# Patient Record
Sex: FEMALE | Race: WHITE | NOT HISPANIC OR LATINO | Employment: STUDENT | URBAN - METROPOLITAN AREA
[De-identification: names, ages, dates, MRNs, and addresses within clinical notes are randomized per-mention and may not be internally consistent; named-entity substitution may affect disease eponyms.]

---
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
## 2023-04-07 ENCOUNTER — OFFICE VISIT (OUTPATIENT)
Dept: OBGYN CLINIC | Facility: CLINIC | Age: 18
End: 2023-04-07

## 2023-04-07 VITALS
BODY MASS INDEX: 25.52 KG/M2 | HEIGHT: 63 IN | SYSTOLIC BLOOD PRESSURE: 110 MMHG | DIASTOLIC BLOOD PRESSURE: 60 MMHG | WEIGHT: 144 LBS

## 2023-04-07 DIAGNOSIS — Z11.3 SCREEN FOR STD (SEXUALLY TRANSMITTED DISEASE): ICD-10-CM

## 2023-04-07 DIAGNOSIS — B37.31 CANDIDA VAGINITIS: ICD-10-CM

## 2023-04-07 DIAGNOSIS — N76.2 ACUTE VULVITIS: Primary | ICD-10-CM

## 2023-04-07 LAB
BV WHIFF TEST VAG QL: ABNORMAL
CLUE CELLS SPEC QL WET PREP: ABNORMAL
PH SMN: 5 [PH]
SL AMB POCT WET MOUNT: ABNORMAL
T VAGINALIS VAG QL WET PREP: ABNORMAL
YEAST VAG QL WET PREP: ABNORMAL

## 2023-04-07 RX ORDER — FLUCONAZOLE 150 MG/1
150 TABLET ORAL DAILY
Qty: 3 TABLET | Refills: 1 | Status: SHIPPED | OUTPATIENT
Start: 2023-04-07 | End: 2023-04-10

## 2023-04-07 NOTE — PROGRESS NOTES
"PROBLEM GYNECOLOGICAL VISIT    Jane Hawthorne is a 21 y o  female who presents today with complaint of sore  Vulvar area  Her general medical history has been reviewed and she reports it as follows:    Past Medical History:   Diagnosis Date   • Gardasil complete per pt report    • Migraine      Past Surgical History:   Procedure Laterality Date   • WISDOM TOOTH EXTRACTION       OB History    No obstetric history on file  Social History     Tobacco Use   • Smoking status: Never     Passive exposure: Never   • Smokeless tobacco: Never   Vaping Use   • Vaping Use: Never used   Substance Use Topics   • Alcohol use: Not Currently   • Drug use: Never       Current Outpatient Medications   Medication Instructions   • fluconazole (DIFLUCAN) 150 mg, Oral, Daily, Take one  Tablet by mouth   Every  3 days for a total of 3 doses       History of Present Illness:   Broke up with  boyfriend 2 weeks ago  Now w  Vaginal  Pain , itching and  Dc  Onset  2 weeks ago  It burned and was painful on urination this am   Urine touching the outside  No fevers or chills  Review of Systems:  Review of Systems   Constitutional: Negative for activity change, appetite change and chills  Gastrointestinal: Negative  Genitourinary: Positive for dysuria, vaginal discharge and vaginal pain  Negative for decreased urine volume, difficulty urinating, dyspareunia, enuresis, flank pain, frequency, menstrual problem, pelvic pain, urgency and vaginal bleeding  Neurological: Negative  Psychiatric/Behavioral: Negative  Physical Exam:  /60 (BP Location: Left arm, Patient Position: Sitting, Cuff Size: Standard)   Ht 5' 3\" (1 6 m)   Wt 65 3 kg (144 lb)   LMP 03/14/2023   BMI 25 51 kg/m²   Physical Exam  Constitutional:       Appearance: Normal appearance  Genitourinary:      Bladder, rectum and urethral meatus normal       No lesions in the vagina        Genitourinary Comments: Copious white chunky dc   Ph 5 neg " wiff no clue  + budding and  Hyphae  Right Labia: tenderness  Right Labia: No rash, lesions or skin changes  Left Labia: tenderness  Left Labia: No lesions, skin changes or rash  No inguinal adenopathy present in the right or left side  Pelvic Valentino Score: 4/5  Vaginal discharge, erythema and tenderness present  No vaginal bleeding or ulceration  No vaginal prolapse present  Right Adnexa: not tender, not full, no mass present and not absent  Left Adnexa: not tender, not full, no mass present and not absent  Cervix is nulliparous  No cervical motion tenderness, discharge, friability or lesion  Uterus is not enlarged, fixed, tender, irregular or prolapsed  No uterine mass detected  No urethral prolapse, tenderness, mass or hypermobility present  Pelvic Floor: Levator muscle strength is 4/5  Pelvic floor neuro is intact  Pelvic exam was performed with patient in the lithotomy position  Abdominal:      Palpations: Abdomen is soft  Hernia: There is no hernia in the left inguinal area or right inguinal area  Lymphadenopathy:      Lower Body: No right inguinal adenopathy  No left inguinal adenopathy  Vitals and nursing note reviewed  Point of Care Testing:   Ph 5, neg whif f + hyphae and  Budding  No clue or trichomadeses      Assessment:   1  Candida vaginitis,  Acute vulvitis,   Screen for std     Plan:   GC chlma sent,  Open to air  vaginal rest,  Wash with plain water  Diflucan 150 mg today and repeat  Every 3 days for a total of 3  Doses  Aquaphor  Healing ointment   To the outside  At least  4 x a day  Fu prn   May pur warm water over vulva  While urinating  Reviewed with patient that test results are available in MyChart immediately, but that they will not necessarily be reviewed by me immediately    Explained that I will review results at my earliest opportunity and contact patient appropriately

## 2023-04-09 LAB
C TRACH RRNA SPEC QL NAA+PROBE: NEGATIVE
N GONORRHOEA RRNA SPEC QL NAA+PROBE: NEGATIVE

## 2023-05-08 ENCOUNTER — OFFICE VISIT (OUTPATIENT)
Dept: OBGYN CLINIC | Facility: CLINIC | Age: 18
End: 2023-05-08

## 2023-05-08 VITALS
SYSTOLIC BLOOD PRESSURE: 110 MMHG | WEIGHT: 144 LBS | BODY MASS INDEX: 25.52 KG/M2 | DIASTOLIC BLOOD PRESSURE: 70 MMHG | HEIGHT: 63 IN

## 2023-05-08 DIAGNOSIS — B37.31 CANDIDA VAGINITIS: ICD-10-CM

## 2023-05-08 DIAGNOSIS — N76.2 ACUTE VULVITIS: Primary | ICD-10-CM

## 2023-05-08 DIAGNOSIS — Z11.3 SCREEN FOR STD (SEXUALLY TRANSMITTED DISEASE): ICD-10-CM

## 2023-05-08 LAB
BV WHIFF TEST VAG QL: ABNORMAL
CLUE CELLS SPEC QL WET PREP: ABNORMAL
PH SMN: 4 [PH]
SL AMB POCT WET MOUNT: ABNORMAL
T VAGINALIS VAG QL WET PREP: ABNORMAL
YEAST VAG QL WET PREP: ABNORMAL

## 2023-05-08 RX ORDER — CLOTRIMAZOLE AND BETAMETHASONE DIPROPIONATE 10; .64 MG/G; MG/G
CREAM TOPICAL 2 TIMES DAILY
Qty: 30 G | Refills: 0 | Status: SHIPPED | OUTPATIENT
Start: 2023-05-08

## 2023-05-08 RX ORDER — FLUCONAZOLE 150 MG/1
150 TABLET ORAL DAILY
Qty: 2 TABLET | Refills: 1 | Status: SHIPPED | OUTPATIENT
Start: 2023-05-08 | End: 2023-05-10

## 2023-05-08 NOTE — PATIENT INSTRUCTIONS
Vaginitis   AMBULATORY CARE:   Vaginitis  is an inflammation or infection of your vagina  Vaginitis is commonly caused by a bacterial or fungal infection  Other causes include a foreign object or exposure to a chemical irritant  You may be given medicines to treat an infection caused by bacteria or a fungus  Medicines may be given as a pill, or as a cream, gel, or tablet you insert into your vagina  Common signs and symptoms:   Pain, itching, redness, burning, or swelling in your vagina    An odor from your vagina that may be foul or smell like fish    Thick, curd-like discharge    Thin, gray-white discharge    Small skin tears or chafing    Painful sexual intercourse    Pain when you urinate    Call your doctor if:   You have unusual vaginal bleeding  You have severe abdominal pain  You have a fever  Your symptoms get worse, even after treatment  Your symptoms return  You have questions or concerns about your condition or care  Manage your symptoms:   Take a sitz bath  Fill a bathtub with 4 to 6 inches of warm water  You may also use a sitz bath pan that fits inside a toilet bowl  Sit in the sitz bath for 15 minutes  Do this 3 times a day, and after each bowel movement  The warm water can help decrease pain and swelling  Use over-the-counter creams or ointments as directed  Examples include petroleum jelly, zinc creams, or hydrocortisone creams  These will help decrease itching and inflammation  Do not wear tight-fitting clothes or undergarments  These can make your symptoms worse  Do not use douches other irritating products in your vagina  Examples include bubble baths and perfumed soaps  The vagina is delicate and easily irritated  Ask your healthcare provider if it is okay to use tampons during your monthly periods  You may need to use pads instead until your symptoms go away  Do not have sex until your symptoms go away  When you have sex, always use a condom   Condoms can help protect you from contact with fluids from your partner that may be causing your vaginitis  Prevent infection:   Wash your hands  with soap and water after you use the toilet  Wipe from front to back  Do this after you urinate or have a bowel movement  This will prevent germs from getting into your vagina  Wash your vagina each day  Use mild, unscented soap  Let the area air dry  Follow up with your doctor as directed: You may need to return within 3 months for more testing to make sure the infection has not returned  Write down your questions so you remember to ask them during your visits  © Copyright [facility] [employer] 2022 Information is for End User's use only and may not be sold, redistributed or otherwise used for commercial purposes  The above information is an  only  It is not intended as medical advice for individual conditions or treatments  Talk to your doctor, nurse or pharmacist before following any medical regimen to see if it is safe and effective for you

## 2023-05-08 NOTE — PROGRESS NOTES
PROBLEM GYNECOLOGICAL VISIT    Carley Lagunas is a 21 y o  female who presents today with complaint of vagianl and vulvar itching and burning     Her general medical history has been reviewed and she reports it as follows:    Past Medical History:   Diagnosis Date   • Gardasil complete per pt report    • Migraine      Past Surgical History:   Procedure Laterality Date   • WISDOM TOOTH EXTRACTION       OB History    No obstetric history on file  Social History     Tobacco Use   • Smoking status: Never     Passive exposure: Never   • Smokeless tobacco: Never   Vaping Use   • Vaping Use: Never used   Substance Use Topics   • Alcohol use: Not Currently   • Drug use: Never       No current outpatient medications    History of Present Illness: Pt utilized  diflucan a few weeks ago  Period came and felt much better  Over the past  2 weeks has had  Itching  + burning   Outside is sore  New partner  + use of condoms   No change in personal products  No fevers or chills  Period started  2 d ago  Most discomfort is outside  Uses pads at night an tampons during  the day  Multiple use of   Topical  Yeast meds   Review of Systems:  Review of Systems   Constitutional: Negative  Gastrointestinal: Negative  Genitourinary: Positive for pelvic pain and vaginal discharge  Negative for decreased urine volume, difficulty urinating, dyspareunia, dysuria, enuresis, flank pain, frequency, genital sores, hematuria, menstrual problem, urgency, vaginal bleeding and vaginal pain  Musculoskeletal: Negative  Neurological: Negative  Hematological: Negative  Psychiatric/Behavioral: Negative  Physical Exam:  There were no vitals taken for this visit  Physical Exam  Constitutional:       Appearance: Normal appearance  Genitourinary:      Vulva, bladder, rectum and urethral meatus normal       Right Labia: No rash, tenderness, lesions or skin changes       Left Labia: No tenderness, lesions, skin changes or rash  No inguinal adenopathy present in the right or left side  Pelvic Valentino Score: 4/5  Vaginal bleeding present  No vaginal discharge, erythema, tenderness, ulceration or granulation tissue  No vaginal prolapse present  No vaginal atrophy present  Right Adnexa: not tender, not full and no mass present  Left Adnexa: not tender, not full and no mass present  No cervical motion tenderness  Uterus is not enlarged, fixed, tender, irregular or prolapsed  No uterine mass detected  Pelvic floor neuro is intact  Pelvic exam was performed with patient in the lithotomy position  Abdominal:      General: Abdomen is flat  Bowel sounds are normal  There is no distension  Palpations: Abdomen is soft  There is no mass  Tenderness: There is no abdominal tenderness  There is no guarding or rebound  Hernia: No hernia is present  There is no hernia in the left inguinal area or right inguinal area  Musculoskeletal:         General: Normal range of motion  Lymphadenopathy:      Lower Body: No right inguinal adenopathy  No left inguinal adenopathy  Neurological:      Mental Status: She is alert and oriented to person, place, and time  Skin:     General: Skin is warm and dry  Psychiatric:         Mood and Affect: Mood normal          Behavior: Behavior normal          Thought Content: Thought content normal          Judgment: Judgment normal    Vitals and nursing note reviewed  Point of Care Testing:   -Wet mount: neg   -KOH mount: neg   -Whiff: neg  Ph  4  Blood present     -urine pregnancy test: na   -urinalysis: na    Assessment:   1  Ongoing vaginitis  Multiple otc  , Vulvitis  w  No relief w oral meds, Menses ,   Screen for std      Plan:   GC   Chlam done,  Open to air , Diflucan 150 mg today May use pea size  Lotrisone to the external vulvar area  May use A+D ointment  Always have something on the vulva    Open toair No underwear to bed at night       Reviewed with patient that test results are available in MyChart immediately, but that they will not necessarily be reviewed by me immediately  Explained that I will review results at my earliest opportunity and contact patient appropriately

## 2023-05-11 LAB
C TRACH RRNA SPEC QL NAA+PROBE: NEGATIVE
N GONORRHOEA RRNA SPEC QL NAA+PROBE: NEGATIVE

## 2023-05-18 ENCOUNTER — OFFICE VISIT (OUTPATIENT)
Dept: OBGYN CLINIC | Facility: CLINIC | Age: 18
End: 2023-05-18

## 2023-05-18 VITALS
BODY MASS INDEX: 25.52 KG/M2 | SYSTOLIC BLOOD PRESSURE: 110 MMHG | DIASTOLIC BLOOD PRESSURE: 78 MMHG | HEIGHT: 63 IN | WEIGHT: 144 LBS

## 2023-05-18 DIAGNOSIS — B37.9 YEAST INFECTION: Primary | ICD-10-CM

## 2023-05-18 LAB
BV WHIFF TEST VAG QL: NEGATIVE
CLUE CELLS SPEC QL WET PREP: ABNORMAL
PH SMN: 4.5 [PH]
SL AMB POCT WET MOUNT: ABNORMAL
T VAGINALIS VAG QL WET PREP: ABNORMAL
YEAST VAG QL WET PREP: ABNORMAL

## 2023-05-18 NOTE — PROGRESS NOTES
Assessment/Plan:    Yeast infection  Reviewed continued yeast infection on exam  Will trial a course of terconazole to see if can achieve clearance  Terconazole 3 day sent to pharmacy  For prevention: Recommend acidophilus or yogurt daily, avoid irritating soaps or lotions, no tight fitted pants or underwear, avoid bubble baths, do not stay in wet swimsuit  Reach out to office if symptoms are not improving or worsening  Problem List Items Addressed This Visit        Other    Yeast infection - Primary     Reviewed continued yeast infection on exam  Will trial a course of terconazole to see if can achieve clearance  Terconazole 3 day sent to pharmacy  For prevention: Recommend acidophilus or yogurt daily, avoid irritating soaps or lotions, no tight fitted pants or underwear, avoid bubble baths, do not stay in wet swimsuit  Reach out to office if symptoms are not improving or worsening  Relevant Medications    terconazole (TERAZOL 3) 0 8 % vaginal cream    Other Relevant Orders    POCT wet mount (Completed)         Subjective:      Patient ID: Trisha Vega is a 21 y o  female  HPI  22 yo seen for continued vaginal irritation  Patient was seen 4/7/2023 and treated for yeast infection  Was seen again on 5/8/2023 with continued symptoms  At that time was treated with Lotrisone cream  Patient reports has also tried Monistat with no relief  Reports still having some thin white vaginal discharge and itching and irritation of vulva  Denies any STD concerns, STD testing negative at last visit, no new partners  Denies vaginal odor  The following portions of the patient's history were reviewed and updated as appropriate:   She  has a past medical history of Gardasil complete per pt report and Migraine    She   Patient Active Problem List    Diagnosis Date Noted   • Yeast infection 05/18/2023   • Screen for STD (sexually transmitted disease) 05/08/2023   • Candida vaginitis 05/08/2023 • Encounter for gynecological examination (general) (routine) without abnormal findings 12/16/2021     She  has a past surgical history that includes [place] tooth extraction  Her family history includes Arthritis in her father; Asthma in her mother; Heart failure (age of onset: 48) in her father; Hypertension in her father and mother; No Known Problems in her brother, maternal grandfather, and maternal grandmother  She  reports that she has never smoked  She has never been exposed to tobacco smoke  She has never used smokeless tobacco  She reports that she does not currently use alcohol  She reports that she does not use drugs  Current Outpatient Medications   Medication Sig Dispense Refill   • clotrimazole-betamethasone (LOTRISONE) 1-0 05 % cream Apply topically 2 (two) times a day 30 g 0   • terconazole (TERAZOL 3) 0 8 % vaginal cream Insert 1 applicator into the vagina daily at bedtime for 3 days 20 g 0     No current facility-administered medications for this visit  She has No Known Allergies       Review of Systems   Constitutional: Negative for fatigue, fever and unexpected weight change  HENT: Negative for dental problem and sinus pressure  Eyes: Negative for visual disturbance  Respiratory: Negative for cough, shortness of breath and wheezing  Cardiovascular: Negative for chest pain  Gastrointestinal: Negative for abdominal pain, blood in stool, constipation, diarrhea, nausea and vomiting  Endocrine: Negative for polydipsia  Genitourinary: Positive for vaginal discharge and vaginal pain  Negative for difficulty urinating, dyspareunia, dysuria, frequency, hematuria, pelvic pain and urgency  Musculoskeletal: Negative for arthralgias and back pain  Neurological: Negative for dizziness, seizures, light-headedness and headaches  Psychiatric/Behavioral: Negative for suicidal ideas  The patient is not nervous/anxious            Objective:      /78 (BP Location: Right arm, Patient "Position: Sitting, Cuff Size: Standard)   Ht 5' 3\" (1 6 m)   Wt 65 3 kg (144 lb)   LMP 05/06/2023   BMI 25 51 kg/m²          Physical Exam  Constitutional:       Appearance: Normal appearance  She is well-developed  Neck:      Thyroid: No thyroid mass or thyromegaly  Cardiovascular:      Rate and Rhythm: Normal rate and regular rhythm  Heart sounds: Normal heart sounds  No murmur heard  No friction rub  No gallop  Pulmonary:      Effort: Pulmonary effort is normal  No respiratory distress  Breath sounds: Normal breath sounds  No wheezing or rales  Abdominal:      General: Bowel sounds are normal  There is no distension  Palpations: Abdomen is soft  There is no mass  Tenderness: There is no abdominal tenderness  There is no guarding or rebound  Genitourinary:     Labia:         Right: No rash, tenderness, lesion or injury  Left: No rash, tenderness, lesion or injury  Urethra: No prolapse, urethral pain, urethral swelling or urethral lesion  Vagina: No signs of injury  Vaginal discharge (thin white discharge) and erythema present  No tenderness or bleeding  Cervix: No cervical motion tenderness, discharge or friability  Uterus: Not deviated, not enlarged and not tender  Adnexa:         Right: No mass, tenderness or fullness  Left: No mass, tenderness or fullness  Musculoskeletal:      Cervical back: Neck supple  Lymphadenopathy:      Cervical: No cervical adenopathy  Upper Body:      Right upper body: No supraclavicular adenopathy  Left upper body: No supraclavicular adenopathy  Skin:     General: Skin is warm and dry  Coloration: Skin is not pale  Findings: No erythema or rash  Neurological:      Mental Status: She is alert and oriented to person, place, and time  Psychiatric:         Behavior: Behavior normal          Thought Content:  Thought content normal          Judgment: Judgment normal        " Wet mount: budding yeast throughout  Scant clue cells  Whiff negative  PH: 4 5 No trichomonads

## 2023-05-18 NOTE — ASSESSMENT & PLAN NOTE
Reviewed continued yeast infection on exam  Will trial a course of terconazole to see if can achieve clearance  Terconazole 3 day sent to pharmacy  For prevention: Recommend acidophilus or yogurt daily, avoid irritating soaps or lotions, no tight fitted pants or underwear, avoid bubble baths, do not stay in wet swimsuit  Reach out to office if symptoms are not improving or worsening

## 2023-07-07 PROBLEM — Z11.3 SCREEN FOR STD (SEXUALLY TRANSMITTED DISEASE): Status: RESOLVED | Noted: 2023-05-08 | Resolved: 2023-07-07

## 2023-08-07 ENCOUNTER — OFFICE VISIT (OUTPATIENT)
Dept: OBGYN CLINIC | Facility: CLINIC | Age: 18
End: 2023-08-07
Payer: COMMERCIAL

## 2023-08-07 VITALS
HEIGHT: 63 IN | SYSTOLIC BLOOD PRESSURE: 110 MMHG | BODY MASS INDEX: 25.52 KG/M2 | WEIGHT: 144 LBS | DIASTOLIC BLOOD PRESSURE: 70 MMHG

## 2023-08-07 DIAGNOSIS — Z30.09 CONTRACEPTIVE EDUCATION: ICD-10-CM

## 2023-08-07 DIAGNOSIS — N92.6 IRREGULAR MENSES: Primary | ICD-10-CM

## 2023-08-07 PROBLEM — Z11.3 SCREEN FOR STD (SEXUALLY TRANSMITTED DISEASE): Status: ACTIVE | Noted: 2023-08-07

## 2023-08-07 PROCEDURE — 99214 OFFICE O/P EST MOD 30 MIN: CPT | Performed by: OBSTETRICS & GYNECOLOGY

## 2023-08-07 RX ORDER — LEVONORGESTREL AND ETHINYL ESTRADIOL 0.1-0.02MG
1 KIT ORAL DAILY
Qty: 90 TABLET | Refills: 0 | Status: SHIPPED | OUTPATIENT
Start: 2023-08-07 | End: 2023-11-05

## 2023-08-07 NOTE — PROGRESS NOTES
PROBLEM GYNECOLOGICAL VISIT    Lea Shaikh is a 21 y.o. female who presents today with complaint of cycles  Being  Unpredictable  cramps.  + PMS Her general medical history has been reviewed and she reports it as follows:    Past Medical History:   Diagnosis Date   • Anemia    • Gardasil complete per pt report    • Migraine      Past Surgical History:   Procedure Laterality Date   • WISDOM TOOTH EXTRACTION       OB History    No obstetric history on file. Social History     Tobacco Use   • Smoking status: Never     Passive exposure: Never   • Smokeless tobacco: Never   Vaping Use   • Vaping Use: Never used   Substance Use Topics   • Alcohol use: Not Currently   • Drug use: Never       Current Outpatient Medications   Medication Instructions   • clotrimazole-betamethasone (LOTRISONE) 1-0.05 % cream Topical, 2 times daily       History of Present Illness:   + hx of  PMS  Symptoms   breast tenderness for 1-2  D  w  Acne and fatigue. Periods   19  D- 40 d  from  Day one to day one  . Period was  6 d .  + condoms. Most worrisome is how unpredictable  Period s are and  Flow can wax and  Wane. Review of Systems:  Review of Systems   Constitutional: Negative. Respiratory: Negative. Cardiovascular: Negative. Gastrointestinal: Negative. Genitourinary: Positive for menstrual problem and vaginal bleeding. Negative for difficulty urinating, dyspareunia, dysuria, flank pain, frequency, pelvic pain, urgency, vaginal discharge and vaginal pain. Mood swings,  Breast tenderness  , menstrual cramps ,  Flow can  Be heavy then light     Musculoskeletal: Negative. Neurological: Negative. Psychiatric/Behavioral: Negative. Physical Exam:  /70 (BP Location: Left arm, Patient Position: Sitting, Cuff Size: Standard)   Ht 5' 3" (1.6 m)   Wt 65.3 kg (144 lb)   LMP 08/04/2023   BMI 25.51 kg/m²   Physical Exam  Constitutional:       Appearance: Normal appearance.    Neurological: Mental Status: She is alert and oriented to person, place, and time. Psychiatric:         Mood and Affect: Mood normal.         Behavior: Behavior normal.         Thought Content: Thought content normal.         Judgment: Judgment normal.   Vitals reviewed. Labs reviewed from  Fall 2022. Elevated   BUN Creat  Ratio. Needs fu with primary. Menstrual calender reviewed. + condoms. Pt afraid of  Gaining weight and acne w  OCP use. Same partner. R+B of  HARDY, POP, Nuvaring, Patch  , Depo and LARCS  DW pt.    DW pt differences in  Progesterones. Condoms always. Benefits would  Be  Regulation of  Menses, PMS  Symptoms  And  Flow decreased . Reviewed ACHES and BTB. Start pill either today or day one of menses one tablet daily. Assessment:   1. PMS symptoms  dw pt   OCP vs  Prozac, 2. Irregular menstrual  Pattern. Report cycles less than 21 d form day one to day one, lasting greater than 10 d or no menses in 3 mo   Option to regulate. Condoms all the time. I have spent a total time of 30 minutes on 08/07/23 in caring for this patient including Risks and benefits of tx options, Instructions for management, Importance of tx compliance, Counseling / Coordination of care, Documenting in the medical record and Reviewing / ordering tests, medicine, procedures  . Reviewed with patient that test results are available in MyChart immediately, but that they will not necessarily be reviewed by me immediately. Explained that I will review results at my earliest opportunity and contact patient appropriately.

## 2023-08-07 NOTE — PATIENT INSTRUCTIONS
- Through a patient centered approach to contraceptive counseling, we discussed a variety of contraceptive methods including pill, patch, ring, injectable, long-acting reversible methods such as the subdermal contraceptive implant and intrauterine device, and sterilization. We compared the efficacy of various methods using a tiered efficacy chart. We discussed the expected changes on menstrual bleeding and other side effects of various methods. - We assessed for medical conditions that could affect the safety profile of contraception. She does not smoke, denies a history of hypertension or VTE, and denies a history of migraine with aura.    - The patient opts to proceed with the pill

## 2023-09-25 ENCOUNTER — TELEPHONE (OUTPATIENT)
Dept: OBGYN CLINIC | Facility: CLINIC | Age: 18
End: 2023-09-25

## 2023-09-25 DIAGNOSIS — N92.6 IRREGULAR MENSES: ICD-10-CM

## 2023-09-25 RX ORDER — LEVONORGESTREL AND ETHINYL ESTRADIOL 0.1-0.02MG
1 KIT ORAL DAILY
Qty: 28 TABLET | Refills: 0 | Status: SHIPPED | OUTPATIENT
Start: 2023-09-25 | End: 2023-12-24

## 2023-09-25 NOTE — TELEPHONE ENCOUNTER
Patient left v/m requesting a refill for her birth control. Her last visit was on 8/7/23 for irregular menses. Her pharmacy is [facility] as on file. Karen, please advise.

## 2023-09-26 NOTE — TELEPHONE ENCOUNTER
Spoke with Tawanda Ugalde, reviewed Karen's reply. Patient in agreement to schedule well annual .Transferred to reception.

## 2023-10-06 PROBLEM — Z11.3 SCREEN FOR STD (SEXUALLY TRANSMITTED DISEASE): Status: RESOLVED | Noted: 2023-08-07 | Resolved: 2023-10-06

## 2023-10-29 DIAGNOSIS — N92.6 IRREGULAR MENSES: ICD-10-CM

## 2023-10-29 RX ORDER — LEVONORGESTREL AND ETHINYL ESTRADIOL 0.1-0.02MG
1 KIT ORAL DAILY
Qty: 28 TABLET | Refills: 0 | Status: SHIPPED | OUTPATIENT
Start: 2023-10-29 | End: 2023-10-31

## 2023-10-31 ENCOUNTER — ANNUAL EXAM (OUTPATIENT)
Dept: OBGYN CLINIC | Facility: CLINIC | Age: 18
End: 2023-10-31
Payer: COMMERCIAL

## 2023-10-31 VITALS
BODY MASS INDEX: 24.24 KG/M2 | HEIGHT: 64 IN | DIASTOLIC BLOOD PRESSURE: 68 MMHG | WEIGHT: 142 LBS | SYSTOLIC BLOOD PRESSURE: 106 MMHG

## 2023-10-31 DIAGNOSIS — Z01.419 GYNECOLOGIC EXAM NORMAL: Primary | ICD-10-CM

## 2023-10-31 DIAGNOSIS — Z11.3 SCREENING EXAMINATION FOR VENEREAL DISEASE: ICD-10-CM

## 2023-10-31 DIAGNOSIS — N94.6 DYSMENORRHEA: ICD-10-CM

## 2023-10-31 PROCEDURE — 99395 PREV VISIT EST AGE 18-39: CPT | Performed by: PHYSICIAN ASSISTANT

## 2023-10-31 RX ORDER — LEVONORGESTREL AND ETHINYL ESTRADIOL 0.15-0.03
1 KIT ORAL DAILY
Qty: 90 TABLET | Refills: 3 | Status: SHIPPED | OUTPATIENT
Start: 2023-10-31

## 2023-10-31 NOTE — PROGRESS NOTES
Assessment/Plan   Problem List Items Addressed This Visit          Other    Gynecologic exam normal - Primary     Pap guidelines reviewed. Will plan to start pap smears at age 22. STD cultures done today. Reviewed worsening cramping. Reviewed option of increasing dose of this pill vs switching to a different pill. Patient would like to increase dose of this pill first prior to having to adjust to a completely new hormones. Script for [employer] OCP sent to pharmacy. Reviewed when to start, what to do if misses pill. Recommend using condoms for the 1st month on the pill, if misses more than 2 pills in the pack, if on antibiotics and for STD prevention. Reviewed common side effects of the pill including nausea, vomiting, breast pain, bloating, fatigue, mood swings, weight gain, and increased acne. Reassured side effects typically diminish in the first month or two on the pill. Reviewed clotting risk and signs and symptoms of pulmonary embolism, DVT, myocardial infarction, stroke. Return to office for annual or as needed. Other Visit Diagnoses       Dysmenorrhea        Relevant Medications    levonorgestrel-ethinyl estradiol (Chateal) 0.15-30 MG-MCG per tablet    Screening examination for venereal disease        Relevant Orders    Chlamydia/GC amplified DNA by PCR            Subjective:     Patient ID: Mahad Haynes is a 19 y.o. y.o. female. HPI  20 yo seen for annual exam. Currently on [place] OCP,reports first month was fine and then second month more cramping, third month cramping and lasted 2 days longer than normal. Denies mood swings. Denies bowel or bladder issues. Reports menses start early on this pill, about 3 days prior to placebo. Last pap: N/A. The following portions of the patient's history were reviewed and updated as appropriate: She  has a past medical history of Anemia, Gardasil complete per pt report, and Migraine.   She   Patient Active Problem List    Diagnosis Date Noted    Gynecologic exam normal 10/31/2023    Irregular menses 2023    Yeast infection 2023    Tiffany vaginitis 2023    Encounter for gynecological examination (general) (routine) without abnormal findings 2021     She  has a past surgical history that includes [place] tooth extraction. Her family history includes Arthritis in her father; Asthma in her mother; Heart failure in her father; Hypertension in her father and mother; No Known Problems in her maternal grandfather and maternal grandmother; Osteoarthritis in her brother. She  reports that she has never smoked. She has never been exposed to tobacco smoke. She has never used smokeless tobacco. She reports that she does not currently use alcohol. She reports that she does not use drugs. Current Outpatient Medications   Medication Sig Dispense Refill    levonorgestrel-ethinyl estradiol (Chateal) 0.15-30 MG-MCG per tablet Take 1 tablet by mouth daily 90 tablet 3     No current facility-administered medications for this visit. She has No Known Allergies. .    Menstrual History:  OB History          0    Para   0    Term   0       0    AB   0    Living   0         SAB   0    IAB   0    Ectopic   0    Multiple   0    Live Births   0                  Patient's last menstrual period was 10/21/2023 (approximate). Review of Systems   Constitutional:  Negative for fatigue, fever and unexpected weight change. HENT:  Negative for dental problem and sinus pressure. Eyes:  Negative for visual disturbance. Respiratory:  Negative for cough, shortness of breath and wheezing. Cardiovascular:  Negative for chest pain. Gastrointestinal:  Negative for abdominal pain, blood in stool, constipation, diarrhea, nausea and vomiting. Endocrine: Negative for polydipsia. Genitourinary:  Positive for menstrual problem. Negative for difficulty urinating, dyspareunia, dysuria, frequency, hematuria, pelvic pain and urgency. Musculoskeletal:  Negative for arthralgias and back pain. Neurological:  Negative for dizziness, seizures, light-headedness and headaches. Psychiatric/Behavioral:  Negative for suicidal ideas. The patient is not nervous/anxious. Objective:  Vitals:    10/31/23 1246   BP: 106/68   BP Location: Left arm   Patient Position: Sitting   Cuff Size: Standard   Weight: 64.4 kg (142 lb)   Height: 5' 3.5" (1.613 m)      Physical Exam  Constitutional:       Appearance: Normal appearance. She is well-developed. Genitourinary:      Vulva and bladder normal.      No lesions in the vagina. Right Labia: No rash, tenderness, lesions or skin changes. Left Labia: No tenderness, lesions, skin changes or rash. No labial fusion noted. No inguinal adenopathy present in the right or left side. No vaginal discharge, erythema, tenderness or bleeding. Right Adnexa: not tender, not full and no mass present. Left Adnexa: not tender, not full and no mass present. No cervical motion tenderness, discharge or lesion. Uterus is not enlarged, tender or irregular. No uterine mass detected. No urethral prolapse, tenderness or mass present. Bladder is not tender. Breasts:     Breasts are symmetrical.      Right: No swelling, bleeding, inverted nipple, mass, nipple discharge, skin change or tenderness. Left: No swelling, bleeding, inverted nipple, mass, nipple discharge, skin change or tenderness. HENT:      Head: Normocephalic and atraumatic. Neck:      Thyroid: No thyromegaly. Cardiovascular:      Rate and Rhythm: Normal rate and regular rhythm. Heart sounds: Normal heart sounds. No murmur heard. No friction rub. No gallop. Pulmonary:      Effort: Pulmonary effort is normal. No respiratory distress. Breath sounds: Normal breath sounds. No wheezing. Abdominal:      General: There is no distension. Palpations: Abdomen is soft. There is no mass. Tenderness: There is no abdominal tenderness. There is no guarding or rebound. Hernia: No hernia is present. Lymphadenopathy:      Cervical: No cervical adenopathy. Upper Body:      Right upper body: No supraclavicular, axillary or pectoral adenopathy. Left upper body: No supraclavicular, axillary or pectoral adenopathy. Lower Body: No right inguinal adenopathy. No left inguinal adenopathy. Neurological:      Mental Status: She is alert and oriented to person, place, and time. Skin:     General: Skin is warm and dry.    Psychiatric:         Behavior: Behavior normal.

## 2023-10-31 NOTE — ASSESSMENT & PLAN NOTE
Pap guidelines reviewed. Will plan to start pap smears at age 22. STD cultures done today. Reviewed worsening cramping. Reviewed option of increasing dose of this pill vs switching to a different pill. Patient would like to increase dose of this pill first prior to having to adjust to a completely new hormones. Script for [employer] OCP sent to pharmacy. Reviewed when to start, what to do if misses pill. Recommend using condoms for the 1st month on the pill, if misses more than 2 pills in the pack, if on antibiotics and for STD prevention. Reviewed common side effects of the pill including nausea, vomiting, breast pain, bloating, fatigue, mood swings, weight gain, and increased acne. Reassured side effects typically diminish in the first month or two on the pill. Reviewed clotting risk and signs and symptoms of pulmonary embolism, DVT, myocardial infarction, stroke. Return to office for annual or as needed.

## 2023-11-02 LAB
C TRACH RRNA SPEC QL NAA+PROBE: NEGATIVE
N GONORRHOEA RRNA SPEC QL NAA+PROBE: NEGATIVE

## 2023-12-30 PROBLEM — Z01.419 GYNECOLOGIC EXAM NORMAL: Status: RESOLVED | Noted: 2023-10-31 | Resolved: 2023-12-30

## 2024-02-21 PROBLEM — Z01.419 ENCOUNTER FOR GYNECOLOGICAL EXAMINATION (GENERAL) (ROUTINE) WITHOUT ABNORMAL FINDINGS: Status: RESOLVED | Noted: 2021-12-16 | Resolved: 2024-02-21

## 2024-03-04 ENCOUNTER — HOSPITAL ENCOUNTER (EMERGENCY)
Dept: HOSPITAL 99 - EMR | Age: 19
LOS: 1 days | Discharge: HOME | End: 2024-03-05
Payer: COMMERCIAL

## 2024-03-04 VITALS — RESPIRATION RATE: 18 BRPM | SYSTOLIC BLOOD PRESSURE: 140 MMHG | DIASTOLIC BLOOD PRESSURE: 89 MMHG

## 2024-03-04 DIAGNOSIS — M62.838: Primary | ICD-10-CM

## 2024-03-04 DIAGNOSIS — E03.0: ICD-10-CM

## 2024-03-04 LAB
ALBUMIN SERPL-MCNC: 4.7 G/DL (ref 3.5–5)
ALP SERPL-CCNC: 80 U/L (ref 38–126)
ALT SERPL-CCNC: 17 U/L (ref 0–35)
AST SERPL-CCNC: 31 U/L (ref 14–36)
BUN SERPL-MCNC: 9 MG/DL (ref 7–17)
CALCIUM SERPL-MCNC: 9.2 MG/DL (ref 8.4–10.2)
CHLORIDE SERPL-SCNC: 104 MMOL/L (ref 98–107)
CO2 SERPL-SCNC: 25 MMOL/L (ref 22–30)
EGFR: > 60
ERYTHROCYTE [DISTWIDTH] IN BLOOD BY AUTOMATED COUNT: 18.7 % (ref 11.5–14.5)
GLUCOSE SERPL-MCNC: 90 MG/DL (ref 70–99)
HCT VFR BLD AUTO: 33.4 % (ref 37–47)
HGB BLD-MCNC: 10.5 G/DL (ref 12–16)
MCHC RBC AUTO-ENTMCNC: 31.4 G/DL (ref 33–37)
MCV RBC AUTO: 64.6 FL (ref 81–99)
NRBC BLD AUTO-RTO: 0 %
PLATELET # BLD AUTO: 331 10^3/UL (ref 130–400)
POTASSIUM SERPL-SCNC: 3.5 MMOL/L (ref 3.5–5.1)
PROT SERPL-MCNC: 8 G/DL (ref 6.3–8.2)
SODIUM SERPL-SCNC: 137 MMOL/L (ref 135–145)

## 2024-03-04 PROCEDURE — 99283 EMERGENCY DEPT VISIT LOW MDM: CPT

## 2024-03-05 RX ADMIN — DIAZEPAM 5 MG: 5 TABLET ORAL at 00:59

## 2024-09-10 NOTE — TELEPHONE ENCOUNTER
Pt needs to schedule  Wellness exam.  All her visits are problem visits  Since 2021  I will send one pack Statement Selected

## 2024-11-17 DIAGNOSIS — N94.6 DYSMENORRHEA: ICD-10-CM

## 2024-11-25 RX ORDER — LEVONORGESTREL AND ETHINYL ESTRADIOL 0.15-0.03
1 KIT ORAL DAILY
Qty: 84 TABLET | Refills: 0 | Status: SHIPPED | OUTPATIENT
Start: 2024-11-25 | End: 2024-11-27 | Stop reason: SDUPTHER

## 2024-11-27 ENCOUNTER — ANNUAL EXAM (OUTPATIENT)
Dept: OBGYN CLINIC | Facility: CLINIC | Age: 19
End: 2024-11-27
Payer: COMMERCIAL

## 2024-11-27 VITALS
SYSTOLIC BLOOD PRESSURE: 116 MMHG | WEIGHT: 142.2 LBS | DIASTOLIC BLOOD PRESSURE: 64 MMHG | BODY MASS INDEX: 25.2 KG/M2 | HEIGHT: 63 IN

## 2024-11-27 DIAGNOSIS — N94.6 DYSMENORRHEA: ICD-10-CM

## 2024-11-27 DIAGNOSIS — B96.89 BACTERIAL VAGINOSIS: ICD-10-CM

## 2024-11-27 DIAGNOSIS — Z01.419 ROUTINE GYNECOLOGICAL EXAMINATION: Primary | ICD-10-CM

## 2024-11-27 DIAGNOSIS — N76.0 BACTERIAL VAGINOSIS: ICD-10-CM

## 2024-11-27 LAB
CLUE CELLS SPEC QL WET PREP: ABNORMAL
PH SMN: 5 [PH]
SL AMB POCT WET MOUNT: ABNORMAL
T VAGINALIS VAG QL WET PREP: ABNORMAL
YEAST VAG QL WET PREP: ABNORMAL

## 2024-11-27 PROCEDURE — 99395 PREV VISIT EST AGE 18-39: CPT | Performed by: PHYSICIAN ASSISTANT

## 2024-11-27 PROCEDURE — 87210 SMEAR WET MOUNT SALINE/INK: CPT | Performed by: PHYSICIAN ASSISTANT

## 2024-11-27 RX ORDER — METRONIDAZOLE 500 MG/1
500 TABLET ORAL EVERY 12 HOURS SCHEDULED
Qty: 14 TABLET | Refills: 0 | Status: SHIPPED | OUTPATIENT
Start: 2024-11-27 | End: 2024-12-04

## 2024-11-27 RX ORDER — LEVONORGESTREL AND ETHINYL ESTRADIOL 0.15-0.03
1 KIT ORAL DAILY
Qty: 84 TABLET | Refills: 3 | Status: SHIPPED | OUTPATIENT
Start: 2024-11-27

## 2024-11-27 NOTE — PROGRESS NOTES
[facility] [place]  [address] [place] [address]    ASSESSMENT/PLAN: Karyna Gupta is a 19 y.o.  who presents for annual gynecologic exam.    Encounter for routine gynecologic examination  - Routine well woman exam completed today.  - Cervical Cancer Screening: Current ASCCP Guidelines reviewed. Last Pap: Not on file . History of abnormal: N/A  - HPV Vaccination status: Immunization series complete  - STI screening offered including HIV testing: offered, pt declined  - Contraceptive counseling discussed.  Current contraception: oral contraceptives (estrogen/progesterone), Altavera:       Additional problems addressed during this visit:  1. Routine gynecological examination  Assessment & Plan:  Pap guidelines reviewed. Pap with reflex done today.   Script for Altavera refilled to pharmacy.   Return to office for annual or as needed.   Orders:  -     IGP, rfx Aptima HPV ASCU  2. Dysmenorrhea  -     levonorgestrel-ethinyl estradiol (Altavera) 0.15-30 MG-MCG per tablet; Take 1 tablet by mouth daily  3. Bacterial vaginosis  Assessment & Plan:  Reviewed BV with patient in length.   Script for Metronidazole 500mg BID x 7 days given. Aware cannot drink alcohol on this medication.   For prevention: Recommend acidophilus or yogurt daily, avoid irritating soaps or lotions, no tight fitted pants or underwear, avoid bubble baths, do not stay in wet swimsuit. Reviewed boric acid suppositories post coital and after menses.   Call office if symptoms are not improving or returning.   Orders:  -     metroNIDAZOLE (FLAGYL) 500 mg tablet; Take 1 tablet (500 mg total) by mouth every 12 (twelve) hours for 7 days  -     POCT wet mount      CC:  Annual Gynecologic Examination    HPI: Karyna Gupta is a 19 y.o.  who presents for annual gynecologic examination. Currently Altavera OCP, tolerating well would like to continue. Denies bowel or bladder issues.   Reports vaginal discharge and  fishy odor. Comes and goes.     ROS: Negative except as noted in HPI    Patient's last menstrual period was 11/19/2024 (exact date).       She  reports being sexually active and has had partner(s) who are male. She reports using the following methods of birth control/protection: OCP and Pill.       The following portions of the patient's history were reviewed and updated as appropriate:   Past Medical History:   Diagnosis Date    Anemia     Gardasil complete per pt report     Migraine      Past Surgical History:   Procedure Laterality Date    WISDOM TOOTH EXTRACTION       Family History   Problem Relation Age of Onset    Asthma Mother     Hypertension Mother     Heart failure Father 50    Arthritis Father     Hypertension Father     Osteoarthritis Brother     No Known Problems Maternal Grandmother     No Known Problems Maternal Grandfather     Breast cancer Neg Hx     Uterine cancer Neg Hx     Colon cancer Neg Hx     Ovarian cancer Neg Hx     Thrombosis Neg Hx     Heart attack Neg Hx     Stroke Neg Hx      Social History     Socioeconomic History    Marital status: Single     Spouse name: None    Number of children: None    Years of education: None    Highest education level: None   Occupational History    None   Tobacco Use    Smoking status: Never     Passive exposure: Never    Smokeless tobacco: Never   Vaping Use    Vaping status: Never Used   Substance and Sexual Activity    Alcohol use: Not Currently    Drug use: Never    Sexual activity: Yes     Partners: Male     Birth control/protection: OCP, Pill   Other Topics Concern    None   Social History Narrative    None     Social Drivers of Health     Financial Resource Strain: Not on file   Food Insecurity: Not on file   Transportation Needs: Not on file   Physical Activity: Not on file   Stress: Not on file   Social Connections: Not on file   Intimate Partner Violence: Not on file   Housing Stability: Not on file     Outpatient Medications Marked as Taking for  "the 11/27/24 encounter (Annual Exam) with Neisha Domínguez PA-C   Medication    levonorgestrel-ethinyl estradiol (Altavera) 0.15-30 MG-MCG per tablet    metroNIDAZOLE (FLAGYL) 500 mg tablet    [DISCONTINUED] levonorgestrel-ethinyl estradiol (Altavera) 0.15-30 MG-MCG per tablet     No Known Allergies        Objective:  /64 (BP Location: Left arm, Patient Position: Sitting, Cuff Size: Standard)   Ht 5' 3\" (1.6 m)   Wt 64.5 kg (142 lb 3.2 oz)   LMP 11/19/2024 (Exact Date)   Breastfeeding No   BMI 25.19 kg/m²        Chaperone present? Offered, declines.      Physical Exam  Constitutional:       Appearance: Normal appearance. She is well-developed.   Genitourinary:      Vulva and bladder normal.      No lesions in the vagina.      Right Labia: No rash, tenderness, lesions or skin changes.     Left Labia: No tenderness, lesions, skin changes or rash.     No labial fusion noted.      No inguinal adenopathy present in the right or left side.     No vaginal discharge, erythema, tenderness or bleeding.        Right Adnexa: not tender, not full and no mass present.     Left Adnexa: not tender, not full and no mass present.     No cervical motion tenderness, discharge or lesion.      Uterus is not enlarged, tender or irregular.      No uterine mass detected.     No urethral prolapse, tenderness or mass present.      Bladder is not tender.    Breasts:     Breasts are symmetrical.      Right: No swelling, bleeding, inverted nipple, mass, nipple discharge, skin change or tenderness.      Left: No swelling, bleeding, inverted nipple, mass, nipple discharge, skin change or tenderness.   HENT:      Head: Normocephalic and atraumatic.   Neck:      Thyroid: No thyromegaly.   Cardiovascular:      Rate and Rhythm: Normal rate and regular rhythm.      Heart sounds: Normal heart sounds. No murmur heard.     No friction rub. No gallop.   Pulmonary:      Effort: Pulmonary effort is normal. No respiratory distress.      Breath " sounds: Normal breath sounds. No wheezing.   Abdominal:      General: There is no distension.      Palpations: Abdomen is soft. There is no mass.      Tenderness: There is no abdominal tenderness. There is no guarding or rebound.      Hernia: No hernia is present.   Lymphadenopathy:      Cervical: No cervical adenopathy.      Upper Body:      Right upper body: No supraclavicular, axillary or pectoral adenopathy.      Left upper body: No supraclavicular, axillary or pectoral adenopathy.      Lower Body: No right inguinal adenopathy. No left inguinal adenopathy.   Neurological:      Mental Status: She is alert and oriented to person, place, and time.   Skin:     General: Skin is warm and dry.   Psychiatric:         Behavior: Behavior normal.         Wet mount: clue cells throughout. No trichomonads or yeast. Ph: 5.     Neisha Domínguez PA-C  11/29/2024 8:31 AM

## 2024-11-29 PROBLEM — N76.0 BACTERIAL VAGINOSIS: Status: ACTIVE | Noted: 2024-11-29

## 2024-11-29 PROBLEM — B96.89 BACTERIAL VAGINOSIS: Status: ACTIVE | Noted: 2024-11-29

## 2024-11-29 PROBLEM — Z01.419 ROUTINE GYNECOLOGICAL EXAMINATION: Status: ACTIVE | Noted: 2024-11-29

## 2024-11-29 NOTE — ASSESSMENT & PLAN NOTE
Reviewed BV with patient in length.   Script for Metronidazole 500mg BID x 7 days given. Aware cannot drink alcohol on this medication.   For prevention: Recommend acidophilus or yogurt daily, avoid irritating soaps or lotions, no tight fitted pants or underwear, avoid bubble baths, do not stay in wet swimsuit. Reviewed boric acid suppositories post coital and after menses.   Call office if symptoms are not improving or returning.

## 2024-11-29 NOTE — ASSESSMENT & PLAN NOTE
Pap guidelines reviewed. Pap with reflex done today.   Script for Altavera refilled to pharmacy.   Return to office for annual or as needed.

## 2024-12-04 NOTE — TELEPHONE ENCOUNTER
Patient called requesting refill for Altavera . Patient made aware medication was refilled on 11/27/2024 for 84 with 3 refills to [facility] pharmacy. Patient instructed to contact the pharmacy to obtain refills of medication. Patient verbalized understanding.

## 2024-12-13 ENCOUNTER — RESULTS FOLLOW-UP (OUTPATIENT)
Dept: OBGYN CLINIC | Facility: CLINIC | Age: 19
End: 2024-12-13

## 2024-12-13 LAB
CYTOLOGIST CVX/VAG CYTO: ABNORMAL
DX ICD CODE: ABNORMAL
DX ICD CODE: ABNORMAL
HPV I/H RISK 4 DNA CVX QL PROBE+SIG AMP: NEGATIVE
OTHER STN SPEC: ABNORMAL
OTHER STN SPEC: ABNORMAL
PATH REPORT.FINAL DX SPEC: ABNORMAL
PATHOLOGIST CVX/VAG CYTO: ABNORMAL
RECOM F/U CVX/VAG CYTO: ABNORMAL
SL AMB NOTE:: ABNORMAL
SL AMB SPECIMEN ADEQUACY: ABNORMAL
SL AMB TEST METHODOLOGY: ABNORMAL

## 2024-12-29 PROBLEM — Z01.419 ROUTINE GYNECOLOGICAL EXAMINATION: Status: RESOLVED | Noted: 2024-11-29 | Resolved: 2024-12-29

## 2025-02-25 ENCOUNTER — PATIENT MESSAGE (OUTPATIENT)
Dept: OBGYN CLINIC | Facility: CLINIC | Age: 20
End: 2025-02-25

## 2025-02-25 DIAGNOSIS — N76.0 BACTERIAL VAGINOSIS: Primary | ICD-10-CM

## 2025-02-25 DIAGNOSIS — B96.89 BACTERIAL VAGINOSIS: Primary | ICD-10-CM

## 2025-02-25 RX ORDER — METRONIDAZOLE 500 MG/1
500 TABLET ORAL EVERY 12 HOURS SCHEDULED
Qty: 14 TABLET | Refills: 0 | Status: SHIPPED | OUTPATIENT
Start: 2025-02-25 | End: 2025-03-04

## 2025-07-22 ENCOUNTER — OFFICE VISIT (OUTPATIENT)
Dept: OBGYN CLINIC | Facility: CLINIC | Age: 20
End: 2025-07-22
Payer: COMMERCIAL

## 2025-07-22 VITALS
BODY MASS INDEX: 26.05 KG/M2 | SYSTOLIC BLOOD PRESSURE: 118 MMHG | HEIGHT: 63 IN | WEIGHT: 147 LBS | DIASTOLIC BLOOD PRESSURE: 78 MMHG

## 2025-07-22 DIAGNOSIS — Z32.02 NEGATIVE PREGNANCY TEST: ICD-10-CM

## 2025-07-22 DIAGNOSIS — N92.1 BREAKTHROUGH BLEEDING: Primary | ICD-10-CM

## 2025-07-22 LAB — SL AMB POCT URINE HCG: NEGATIVE

## 2025-07-22 PROCEDURE — 81025 URINE PREGNANCY TEST: CPT | Performed by: NURSE PRACTITIONER

## 2025-07-22 PROCEDURE — 99214 OFFICE O/P EST MOD 30 MIN: CPT | Performed by: NURSE PRACTITIONER

## 2025-07-22 NOTE — PROGRESS NOTES
"Assessment/Plan:    Breakthrough bleeding on OCP started end of first week of pill pack turned full on period still light bleeding now in week 3 + cramping No change in pill   Will check TSH, US  Monitor to see if resets with placebos       Diagnoses and all orders for this visit:    Breakthrough bleeding  -     TSH, 3rd generation with Free T4 reflex; Future  -     US pelvis complete w transvaginal; Future  -     TSH, 3rd generation with Free T4 reflex          Subjective:      Patient ID: Karyna Gupta is a 19 y.o. female.    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    Review of Systems   Constitutional:  Negative for fatigue.   Gastrointestinal:  Negative for abdominal pain, constipation and diarrhea.   Genitourinary:  Positive for menstrual problem and vaginal bleeding.        Cramping          Objective:      /78 (BP Location: Right arm, Patient Position: Sitting, Cuff Size: Standard)   Ht 5' 3\" (1.6 m)   Wt 66.7 kg (147 lb)   LMP 07/18/2025 (Exact Date) Comment: Started spotting on 7/12 and then turned into full period 7/18  BMI 26.04 kg/m²          Physical Exam  Vitals and nursing note reviewed.   Constitutional:       General: She is not in acute distress.     Appearance: Normal appearance.   HENT:      Head: Normocephalic and atraumatic.   Pulmonary:      Effort: " Pulmonary effort is normal.   Abdominal:      General: There is no distension.      Palpations: Abdomen is soft. There is no mass.      Tenderness: There is no abdominal tenderness.   Genitourinary:     General: Normal vulva.      Exam position: Lithotomy position.      Labia:         Right: No rash or lesion.         Left: No rash or lesion.       Vagina: Normal. No vaginal discharge or erythema.      Cervix: No cervical motion tenderness, discharge, lesion or cervical bleeding.      Uterus: Not enlarged and not tender.       Adnexa:         Right: No mass or tenderness.          Left: No mass or tenderness.        Rectum: Normal.      Comments: Mod amount of bleeding  Lymphadenopathy:      Lower Body: No right inguinal adenopathy. No left inguinal adenopathy.     Skin:     General: Skin is warm and dry.     Neurological:      General: No focal deficit present.      Mental Status: She is alert and oriented to person, place, and time.     Psychiatric:         Mood and Affect: Mood normal.         Behavior: Behavior normal.         Thought Content: Thought content normal.

## 2025-07-22 NOTE — PATIENT INSTRUCTIONS
Breakthrough bleeding on OCP started end of first week of pill pack turned full on period still light bleeding now in week 3 + cramping No change in pill   Will check TSH, US  Monitor to see if resets with placebos

## 2025-07-29 ENCOUNTER — HOSPITAL ENCOUNTER (OUTPATIENT)
Dept: ULTRASOUND IMAGING | Facility: HOSPITAL | Age: 20
Discharge: HOME | End: 2025-07-29
Attending: NURSE PRACTITIONER
Payer: COMMERCIAL

## 2025-07-29 DIAGNOSIS — N92.1 BREAKTHROUGH BLEEDING: ICD-10-CM

## 2025-07-29 DIAGNOSIS — N83.202 LEFT OVARIAN CYST: Primary | ICD-10-CM

## 2025-07-29 PROCEDURE — 76856 US EXAM PELVIC COMPLETE: CPT

## 2025-07-29 PROCEDURE — 76830 TRANSVAGINAL US NON-OB: CPT

## 2025-07-30 LAB — TSH SERPL DL<=0.005 MIU/L-ACNC: 0.78 UIU/ML (ref 0.45–4.5)
